# Patient Record
Sex: FEMALE | Race: WHITE
[De-identification: names, ages, dates, MRNs, and addresses within clinical notes are randomized per-mention and may not be internally consistent; named-entity substitution may affect disease eponyms.]

---

## 2019-10-18 ENCOUNTER — HOSPITAL ENCOUNTER (INPATIENT)
Dept: HOSPITAL 15 - ER | Age: 74
LOS: 7 days | Discharge: HOSPICE | DRG: 481 | End: 2019-10-25
Attending: INTERNAL MEDICINE | Admitting: INTERNAL MEDICINE
Payer: COMMERCIAL

## 2019-10-18 VITALS — WEIGHT: 174.17 LBS | BODY MASS INDEX: 27.34 KG/M2 | HEIGHT: 67 IN
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
## 2019-10-18 DIAGNOSIS — E78.5: ICD-10-CM

## 2019-10-18 DIAGNOSIS — Z82.49: ICD-10-CM

## 2019-10-18 DIAGNOSIS — S72.142A: Primary | ICD-10-CM

## 2019-10-18 DIAGNOSIS — N39.0: ICD-10-CM
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
## 2019-10-18 DIAGNOSIS — M19.90: ICD-10-CM
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
## 2019-10-18 DIAGNOSIS — F03.90: ICD-10-CM

## 2019-10-18 DIAGNOSIS — I11.0: ICD-10-CM

## 2019-10-18 DIAGNOSIS — I50.9: ICD-10-CM

## 2019-10-18 PROCEDURE — 97530 THERAPEUTIC ACTIVITIES: CPT

## 2019-10-18 PROCEDURE — 80053 COMPREHEN METABOLIC PANEL: CPT

## 2019-10-18 PROCEDURE — 36415 COLL VENOUS BLD VENIPUNCTURE: CPT

## 2019-10-18 PROCEDURE — 86850 RBC ANTIBODY SCREEN: CPT

## 2019-10-18 PROCEDURE — 93005 ELECTROCARDIOGRAM TRACING: CPT

## 2019-10-18 PROCEDURE — 81001 URINALYSIS AUTO W/SCOPE: CPT

## 2019-10-18 PROCEDURE — 87081 CULTURE SCREEN ONLY: CPT

## 2019-10-18 PROCEDURE — 76000 FLUOROSCOPY <1 HR PHYS/QHP: CPT

## 2019-10-18 PROCEDURE — 74018 RADEX ABDOMEN 1 VIEW: CPT

## 2019-10-18 PROCEDURE — 80048 BASIC METABOLIC PNL TOTAL CA: CPT

## 2019-10-18 PROCEDURE — 86900 BLOOD TYPING SEROLOGIC ABO: CPT

## 2019-10-18 PROCEDURE — 51702 INSERT TEMP BLADDER CATH: CPT

## 2019-10-18 PROCEDURE — 87086 URINE CULTURE/COLONY COUNT: CPT

## 2019-10-18 PROCEDURE — 86901 BLOOD TYPING SEROLOGIC RH(D): CPT

## 2019-10-18 PROCEDURE — 71045 X-RAY EXAM CHEST 1 VIEW: CPT

## 2019-10-18 PROCEDURE — 97110 THERAPEUTIC EXERCISES: CPT

## 2019-10-18 PROCEDURE — 96361 HYDRATE IV INFUSION ADD-ON: CPT

## 2019-10-18 PROCEDURE — 73502 X-RAY EXAM HIP UNI 2-3 VIEWS: CPT

## 2019-10-18 PROCEDURE — 93306 TTE W/DOPPLER COMPLETE: CPT

## 2019-10-18 PROCEDURE — 96376 TX/PRO/DX INJ SAME DRUG ADON: CPT

## 2019-10-18 PROCEDURE — 96374 THER/PROPH/DIAG INJ IV PUSH: CPT

## 2019-10-18 PROCEDURE — 72192 CT PELVIS W/O DYE: CPT

## 2019-10-18 PROCEDURE — 85610 PROTHROMBIN TIME: CPT

## 2019-10-18 PROCEDURE — 96375 TX/PRO/DX INJ NEW DRUG ADDON: CPT

## 2019-10-18 PROCEDURE — 85025 COMPLETE CBC W/AUTO DIFF WBC: CPT

## 2019-10-18 PROCEDURE — 85730 THROMBOPLASTIN TIME PARTIAL: CPT

## 2019-10-19 VITALS — SYSTOLIC BLOOD PRESSURE: 126 MMHG | DIASTOLIC BLOOD PRESSURE: 64 MMHG

## 2019-10-19 VITALS — DIASTOLIC BLOOD PRESSURE: 84 MMHG | SYSTOLIC BLOOD PRESSURE: 121 MMHG

## 2019-10-19 VITALS — DIASTOLIC BLOOD PRESSURE: 74 MMHG | SYSTOLIC BLOOD PRESSURE: 135 MMHG

## 2019-10-19 VITALS — SYSTOLIC BLOOD PRESSURE: 129 MMHG | DIASTOLIC BLOOD PRESSURE: 87 MMHG

## 2019-10-19 VITALS — SYSTOLIC BLOOD PRESSURE: 121 MMHG | DIASTOLIC BLOOD PRESSURE: 71 MMHG

## 2019-10-19 LAB
ALBUMIN SERPL-MCNC: 3.7 G/DL (ref 3.4–5)
ALP SERPL-CCNC: 129 U/L (ref 45–117)
ALT SERPL-CCNC: 23 U/L (ref 13–56)
ANION GAP SERPL CALCULATED.3IONS-SCNC: 6 MMOL/L (ref 5–15)
APTT PPP: 23.8 SEC (ref 23.64–32.05)
BILIRUB SERPL-MCNC: 0.3 MG/DL (ref 0.2–1)
BUN SERPL-MCNC: 23 MG/DL (ref 7–18)
BUN/CREAT SERPL: 25
CALCIUM SERPL-MCNC: 8.4 MG/DL (ref 8.5–10.1)
CHLORIDE SERPL-SCNC: 108 MMOL/L (ref 98–107)
CO2 SERPL-SCNC: 26 MMOL/L (ref 21–32)
GLUCOSE SERPL-MCNC: 118 MG/DL (ref 74–106)
HCT VFR BLD AUTO: 33.8 % (ref 36–46)
HGB BLD-MCNC: 11.3 G/DL (ref 12.2–16.2)
INR PPP: 1.06 (ref 0.9–1.15)
MCH RBC QN AUTO: 27.4 PG (ref 28–32)
MCV RBC AUTO: 82 FL (ref 80–100)
NRBC BLD QL AUTO: 0 %
POTASSIUM SERPL-SCNC: 3.7 MMOL/L (ref 3.5–5.1)
PROT SERPL-MCNC: 7.1 G/DL (ref 6.4–8.2)
SODIUM SERPL-SCNC: 140 MMOL/L (ref 136–145)
WBC CLUMPS UR QL AUTO: PRESENT /HPF

## 2019-10-19 RX ADMIN — HYDROCODONE BITARTRATE AND ACETAMINOPHEN PRN TAB: 10; 325 TABLET ORAL at 11:50

## 2019-10-19 RX ADMIN — CEFTRIAXONE SODIUM SCH MLS/HR: 1 INJECTION, POWDER, FOR SOLUTION INTRAMUSCULAR; INTRAVENOUS at 13:30

## 2019-10-19 RX ADMIN — ONDANSETRON HYDROCHLORIDE PRN MG: 2 INJECTION, SOLUTION INTRAMUSCULAR; INTRAVENOUS at 08:25

## 2019-10-19 RX ADMIN — MORPHINE SULFATE PRN MG: 2 INJECTION, SOLUTION INTRAMUSCULAR; INTRAVENOUS at 15:37

## 2019-10-19 RX ADMIN — MORPHINE SULFATE PRN MG: 2 INJECTION, SOLUTION INTRAMUSCULAR; INTRAVENOUS at 08:24

## 2019-10-19 RX ADMIN — MORPHINE SULFATE PRN MG: 2 INJECTION, SOLUTION INTRAMUSCULAR; INTRAVENOUS at 20:01

## 2019-10-19 RX ADMIN — ONDANSETRON HYDROCHLORIDE PRN MG: 2 INJECTION, SOLUTION INTRAMUSCULAR; INTRAVENOUS at 15:37

## 2019-10-19 RX ADMIN — SODIUM CHLORIDE SCH MLS/HR: 4.5 INJECTION, SOLUTION INTRAVENOUS at 18:35

## 2019-10-19 NOTE — NUR
MED/SURG admit from ER

YESICA JOHNSON admitted to Telemetry unit after SBAR received.  Patient oriented to 
Elaina Kapoor, primary RN, unit, room, bed, and unit policies regarding patient care and 
visiting hours. Patient placed on bedside oxygen, weighed by bedscale and encouraged to call 
if they need something. All questions and concerns addressed, patient verbalized 
understanding. 

Note:

## 2019-10-19 NOTE — NUR
Dr. Stanton



Called to check on patients urine culture, and clarification of medication order. plan is to 
hold off on surgery until Monday will notify patients mariusz Hubbard.

## 2019-10-20 VITALS — SYSTOLIC BLOOD PRESSURE: 116 MMHG | DIASTOLIC BLOOD PRESSURE: 68 MMHG

## 2019-10-20 VITALS — DIASTOLIC BLOOD PRESSURE: 72 MMHG | SYSTOLIC BLOOD PRESSURE: 114 MMHG

## 2019-10-20 VITALS — SYSTOLIC BLOOD PRESSURE: 126 MMHG | DIASTOLIC BLOOD PRESSURE: 82 MMHG

## 2019-10-20 VITALS — DIASTOLIC BLOOD PRESSURE: 67 MMHG | SYSTOLIC BLOOD PRESSURE: 107 MMHG

## 2019-10-20 VITALS — SYSTOLIC BLOOD PRESSURE: 124 MMHG | DIASTOLIC BLOOD PRESSURE: 51 MMHG

## 2019-10-20 LAB
ANION GAP SERPL CALCULATED.3IONS-SCNC: 6 MMOL/L (ref 5–15)
BUN SERPL-MCNC: 17 MG/DL (ref 7–18)
BUN/CREAT SERPL: 24.6
CALCIUM SERPL-MCNC: 8.1 MG/DL (ref 8.5–10.1)
CHLORIDE SERPL-SCNC: 109 MMOL/L (ref 98–107)
CO2 SERPL-SCNC: 27 MMOL/L (ref 21–32)
GLUCOSE SERPL-MCNC: 98 MG/DL (ref 74–106)
HCT VFR BLD AUTO: 31.8 % (ref 36–46)
HGB BLD-MCNC: 10.6 G/DL (ref 12.2–16.2)
MCH RBC QN AUTO: 27.2 PG (ref 28–32)
MCV RBC AUTO: 82.1 FL (ref 80–100)
NRBC BLD QL AUTO: 0.1 %
POTASSIUM SERPL-SCNC: 3.8 MMOL/L (ref 3.5–5.1)
SODIUM SERPL-SCNC: 142 MMOL/L (ref 136–145)

## 2019-10-20 RX ADMIN — MORPHINE SULFATE PRN MG: 2 INJECTION, SOLUTION INTRAMUSCULAR; INTRAVENOUS at 08:28

## 2019-10-20 RX ADMIN — HYDROCODONE BITARTRATE AND ACETAMINOPHEN PRN TAB: 10; 325 TABLET ORAL at 10:34

## 2019-10-20 RX ADMIN — HYDROCODONE BITARTRATE AND ACETAMINOPHEN PRN TAB: 10; 325 TABLET ORAL at 16:08

## 2019-10-20 RX ADMIN — MORPHINE SULFATE PRN MG: 2 INJECTION, SOLUTION INTRAMUSCULAR; INTRAVENOUS at 12:52

## 2019-10-20 RX ADMIN — HYDROCODONE BITARTRATE AND ACETAMINOPHEN PRN TAB: 10; 325 TABLET ORAL at 21:59

## 2019-10-20 RX ADMIN — MORPHINE SULFATE PRN MG: 2 INJECTION, SOLUTION INTRAMUSCULAR; INTRAVENOUS at 17:55

## 2019-10-20 RX ADMIN — SODIUM CHLORIDE SCH MLS/HR: 4.5 INJECTION, SOLUTION INTRAVENOUS at 12:52

## 2019-10-20 RX ADMIN — ENOXAPARIN SODIUM SCH MG: 40 INJECTION SUBCUTANEOUS at 10:00

## 2019-10-20 RX ADMIN — HYDROCODONE BITARTRATE AND ACETAMINOPHEN PRN TAB: 10; 325 TABLET ORAL at 00:06

## 2019-10-20 RX ADMIN — CEFTRIAXONE SODIUM SCH MLS/HR: 1 INJECTION, POWDER, FOR SOLUTION INTRAMUSCULAR; INTRAVENOUS at 08:15

## 2019-10-20 NOTE — NUR
TEMP RECHECK

Rechecked patient temp at this time; 99.8F axillary. Continue cooling measures, will 
continue to monitor

## 2019-10-20 NOTE — NUR
Opening Shift Note

Assumed care of patient, awake and alert. Oriented patient to hospital and primary RN. No 
S/S of distress. Patient c/o pain to left hip. Will medicate patient per MD orders. [place] 
traction to left leg in place, 5 lb weight hanging free. Sweeney in place, below bladder , 
free of kinks, tube and system intact. Instructed on POC and to call for assist PRN. Sitter 
at bedside. Bed in lowest locked position, call light within reach, side rails up x2. 
Patient refusing assessment, educated patient on importance of RN's assessment, patient 
still refusing. Patient stated "Get out of my room". Will continue to monitor for changes 
Q1hr and PRN.

## 2019-10-20 NOTE — NUR
TEMP

Patient temp at this time 100.3F axillary. Cooling measures applied. Will recheck 
approximately 1 hour

## 2019-10-20 NOTE — NUR
FAMILY

Called and spoke with son Stevie. Updated on POC. Stevie verbalized understanding and 
agreement. Stevie stated will come to hospital tomorrow 10/21/19 with complete list of 
patient's home medications for med rec. Will inform day shift RN. Also, Stevie requesting 
to be called before patient is taken off floor for scheduled procedure on 10/21/19. Will 
inform day shift RN

## 2019-10-20 NOTE — NUR
Opening Shift

Assumed care of patient, awake and alert.  No S/S of distress/SOB or pain.  Insructed on POC 
and to callfor assist PRN, will continue to monitor for changes Q1hr and PRN.

 at bedside for patient safety. Fall and safety precautions in place. Call 
light within reach.

## 2019-10-21 VITALS — DIASTOLIC BLOOD PRESSURE: 76 MMHG | SYSTOLIC BLOOD PRESSURE: 130 MMHG

## 2019-10-21 VITALS — SYSTOLIC BLOOD PRESSURE: 127 MMHG | DIASTOLIC BLOOD PRESSURE: 83 MMHG

## 2019-10-21 VITALS — DIASTOLIC BLOOD PRESSURE: 79 MMHG | SYSTOLIC BLOOD PRESSURE: 131 MMHG

## 2019-10-21 VITALS — SYSTOLIC BLOOD PRESSURE: 134 MMHG | DIASTOLIC BLOOD PRESSURE: 77 MMHG

## 2019-10-21 VITALS — SYSTOLIC BLOOD PRESSURE: 139 MMHG | DIASTOLIC BLOOD PRESSURE: 89 MMHG

## 2019-10-21 LAB
ANION GAP SERPL CALCULATED.3IONS-SCNC: 8 MMOL/L (ref 5–15)
APTT PPP: 30.5 SEC (ref 23.64–32.05)
BUN SERPL-MCNC: 18 MG/DL (ref 7–18)
BUN/CREAT SERPL: 23.1
CALCIUM SERPL-MCNC: 8.1 MG/DL (ref 8.5–10.1)
CHLORIDE SERPL-SCNC: 107 MMOL/L (ref 98–107)
CO2 SERPL-SCNC: 26 MMOL/L (ref 21–32)
GLUCOSE SERPL-MCNC: 100 MG/DL (ref 74–106)
HCT VFR BLD AUTO: 35.9 % (ref 36–46)
HGB BLD-MCNC: 11.9 G/DL (ref 12.2–16.2)
INR PPP: 1.03 (ref 0.9–1.15)
MCH RBC QN AUTO: 27.2 PG (ref 28–32)
MCV RBC AUTO: 82.3 FL (ref 80–100)
NRBC BLD QL AUTO: 0.3 %
POTASSIUM SERPL-SCNC: 3.8 MMOL/L (ref 3.5–5.1)
SODIUM SERPL-SCNC: 141 MMOL/L (ref 136–145)

## 2019-10-21 RX ADMIN — HYDROCODONE BITARTRATE AND ACETAMINOPHEN PRN TAB: 10; 325 TABLET ORAL at 03:39

## 2019-10-21 RX ADMIN — MORPHINE SULFATE PRN MG: 2 INJECTION, SOLUTION INTRAMUSCULAR; INTRAVENOUS at 10:54

## 2019-10-21 RX ADMIN — MORPHINE SULFATE PRN MG: 2 INJECTION, SOLUTION INTRAMUSCULAR; INTRAVENOUS at 05:44

## 2019-10-21 RX ADMIN — HYDROCODONE BITARTRATE AND ACETAMINOPHEN PRN TAB: 10; 325 TABLET ORAL at 14:37

## 2019-10-21 RX ADMIN — CEFTRIAXONE SODIUM SCH MLS/HR: 1 INJECTION, POWDER, FOR SOLUTION INTRAMUSCULAR; INTRAVENOUS at 09:00

## 2019-10-21 RX ADMIN — MORPHINE SULFATE PRN MG: 2 INJECTION, SOLUTION INTRAMUSCULAR; INTRAVENOUS at 16:54

## 2019-10-21 RX ADMIN — ENOXAPARIN SODIUM SCH MG: 40 INJECTION SUBCUTANEOUS at 10:00

## 2019-10-21 RX ADMIN — HYDROCODONE BITARTRATE AND ACETAMINOPHEN PRN TAB: 10; 325 TABLET ORAL at 08:21

## 2019-10-21 RX ADMIN — ONDANSETRON HYDROCHLORIDE PRN MG: 2 INJECTION, SOLUTION INTRAMUSCULAR; INTRAVENOUS at 05:44

## 2019-10-21 RX ADMIN — SODIUM CHLORIDE SCH MLS/HR: 4.5 INJECTION, SOLUTION INTRAVENOUS at 10:30

## 2019-10-21 NOTE — NUR
TEMP RECHECK

Rechecked patient's temp at this time; 99.1F axillary. Will continue cooling measures, 
continue to monitor

## 2019-10-21 NOTE — NUR
NUTRITION ASSESSMENT NOTES



Please refer to link notes of nutrition screen form filed under the intervention section of 
the plan of care for further details.



Est. Needs: 1700 kcal to 2100 kcal (20-25 kcal/kgBW), 67 gms to 84 gms pro (0.8-1.0 
gms/kgBW). Will continue to monitor pertinent labs and reassess nutrient need prn



Thank you.


-------------------------------------------------------------------------------

Addendum: 10/21/19 at 1246 by Reanna Gallagher RD

-------------------------------------------------------------------------------

Amended: Links added.

## 2019-10-21 NOTE — NUR
Opening Shift

Assumed care of patient, awake and alert.  No S/S of distress/SOB or pain.  Insructed on POC 
and to callfor assist PRN, will continue to monitor for changes Q1hr and PRN.

 at bedside and mittens placed on bilateral hands for patient safety and 
prevention of tube pulling. Fall and safety precautions in place. Call light within reach.

## 2019-10-21 NOTE — NUR
EKG/CHG

EKG done for pre-op. Patient refusing to cooperate, refusing to lie still for clear EKG. 
Patient refusing education regarding lying still for clear EKG, stating "please take it off, 
it hurts, let me go home, I want to go home." EKG done, printed, and placed in hard chart.



Patient refusing CHG bath at this time, refusing to turn, refusing to cooperate. Patient 
stating same statements as EKG. Partial CHG bath could be completed, complete linen change 
done, and gown changed. Patient did not tolerate well, crying and screaming in pain, even 
after being pre-medicated with PRN pain medication (see emar).

## 2019-10-21 NOTE — NUR
MORRISON CLAMP

Morrison catheter tubing clamped at this time to collect urine sample. Will recheck 
approximately 15 minutes

## 2019-10-21 NOTE — NUR
URINE SAMPLE

Urine sample collected from stock catheter tubing and sent to lab via bullet. Stock catheter 
unclamped. Will continue to monitor

## 2019-10-22 VITALS — SYSTOLIC BLOOD PRESSURE: 119 MMHG | DIASTOLIC BLOOD PRESSURE: 76 MMHG

## 2019-10-22 VITALS — DIASTOLIC BLOOD PRESSURE: 65 MMHG | SYSTOLIC BLOOD PRESSURE: 103 MMHG

## 2019-10-22 VITALS — DIASTOLIC BLOOD PRESSURE: 69 MMHG | SYSTOLIC BLOOD PRESSURE: 139 MMHG

## 2019-10-22 VITALS — DIASTOLIC BLOOD PRESSURE: 69 MMHG | SYSTOLIC BLOOD PRESSURE: 134 MMHG

## 2019-10-22 VITALS — DIASTOLIC BLOOD PRESSURE: 84 MMHG | SYSTOLIC BLOOD PRESSURE: 126 MMHG

## 2019-10-22 VITALS — SYSTOLIC BLOOD PRESSURE: 125 MMHG | DIASTOLIC BLOOD PRESSURE: 78 MMHG

## 2019-10-22 LAB
ANION GAP SERPL CALCULATED.3IONS-SCNC: 8 MMOL/L (ref 5–15)
BUN SERPL-MCNC: 19 MG/DL (ref 7–18)
BUN/CREAT SERPL: 26
CALCIUM SERPL-MCNC: 8.1 MG/DL (ref 8.5–10.1)
CHLORIDE SERPL-SCNC: 108 MMOL/L (ref 98–107)
CO2 SERPL-SCNC: 25 MMOL/L (ref 21–32)
GLUCOSE SERPL-MCNC: 108 MG/DL (ref 74–106)
HCT VFR BLD AUTO: 34 % (ref 36–46)
HGB BLD-MCNC: 11.2 G/DL (ref 12.2–16.2)
MCH RBC QN AUTO: 27 PG (ref 28–32)
MCV RBC AUTO: 82 FL (ref 80–100)
NRBC BLD QL AUTO: 0.1 %
POTASSIUM SERPL-SCNC: 3.9 MMOL/L (ref 3.5–5.1)
SODIUM SERPL-SCNC: 141 MMOL/L (ref 136–145)

## 2019-10-22 PROCEDURE — 0QS706Z REPOSITION LEFT UPPER FEMUR WITH INTRAMEDULLARY INTERNAL FIXATION DEVICE, OPEN APPROACH: ICD-10-PCS | Performed by: ORTHOPAEDIC SURGERY

## 2019-10-22 RX ADMIN — MORPHINE SULFATE PRN MG: 2 INJECTION, SOLUTION INTRAMUSCULAR; INTRAVENOUS at 15:20

## 2019-10-22 RX ADMIN — CEFAZOLIN SODIUM SCH MLS/HR: 1 INJECTION, SOLUTION INTRAVENOUS at 20:41

## 2019-10-22 RX ADMIN — CEFTRIAXONE SODIUM SCH MLS/HR: 1 INJECTION, POWDER, FOR SOLUTION INTRAMUSCULAR; INTRAVENOUS at 09:00

## 2019-10-22 RX ADMIN — SODIUM CHLORIDE SCH MLS/HR: 4.5 INJECTION, SOLUTION INTRAVENOUS at 06:15

## 2019-10-22 RX ADMIN — CEFAZOLIN SODIUM SCH MLS/HR: 1 INJECTION, SOLUTION INTRAVENOUS at 15:21

## 2019-10-22 RX ADMIN — MORPHINE SULFATE PRN MG: 2 INJECTION, SOLUTION INTRAMUSCULAR; INTRAVENOUS at 00:50

## 2019-10-22 RX ADMIN — ENOXAPARIN SODIUM SCH MG: 40 INJECTION SUBCUTANEOUS at 09:08

## 2019-10-22 RX ADMIN — MORPHINE SULFATE PRN MG: 2 INJECTION, SOLUTION INTRAMUSCULAR; INTRAVENOUS at 05:51

## 2019-10-22 RX ADMIN — MORPHINE SULFATE PRN MG: 2 INJECTION, SOLUTION INTRAMUSCULAR; INTRAVENOUS at 20:41

## 2019-10-22 NOTE — NUR
RECEIVED REPORT FROM NIGHT NURSE. PATIENT RESTING IN BED, ALERT TO SELF, CONTINUES TO CALL 
OUT FOR HELP AND REPEATING OVER AND OVER THAT SHE WANTS TO GO HOME. SITTER AT BEDSIDE, 
LUCILA ON TIMES 2, PATIENT CONTINUES TO PULL AT IV LINES AND MORRISON LINE. VITALS STABLE. NPO 
STATUS FOR PROCEDURE.

## 2019-10-22 NOTE — NUR
CHG

Patient refusing and not cooperating for CHG bath. Attempted to educate patient importance 
of CHG bath before procedure and linen change, but patient refusing education, stating "It 
hurts, just please take it out, please take these off, it hurts" and crying. Will inform day 
shift RN that CHG bath not done

## 2019-10-23 VITALS — DIASTOLIC BLOOD PRESSURE: 70 MMHG | SYSTOLIC BLOOD PRESSURE: 114 MMHG

## 2019-10-23 VITALS — SYSTOLIC BLOOD PRESSURE: 134 MMHG | DIASTOLIC BLOOD PRESSURE: 88 MMHG

## 2019-10-23 VITALS — DIASTOLIC BLOOD PRESSURE: 76 MMHG | SYSTOLIC BLOOD PRESSURE: 135 MMHG

## 2019-10-23 VITALS — SYSTOLIC BLOOD PRESSURE: 133 MMHG | DIASTOLIC BLOOD PRESSURE: 78 MMHG

## 2019-10-23 VITALS — DIASTOLIC BLOOD PRESSURE: 81 MMHG | SYSTOLIC BLOOD PRESSURE: 132 MMHG

## 2019-10-23 LAB
ANION GAP SERPL CALCULATED.3IONS-SCNC: 7 MMOL/L (ref 5–15)
BUN SERPL-MCNC: 19 MG/DL (ref 7–18)
BUN/CREAT SERPL: 24.7
CALCIUM SERPL-MCNC: 8 MG/DL (ref 8.5–10.1)
CHLORIDE SERPL-SCNC: 106 MMOL/L (ref 98–107)
CO2 SERPL-SCNC: 27 MMOL/L (ref 21–32)
GLUCOSE SERPL-MCNC: 150 MG/DL (ref 74–106)
HCT VFR BLD AUTO: 32.7 % (ref 36–46)
HGB BLD-MCNC: 10.8 G/DL (ref 12.2–16.2)
MCH RBC QN AUTO: 27 PG (ref 28–32)
MCV RBC AUTO: 82 FL (ref 80–100)
NRBC BLD QL AUTO: 0 %
POTASSIUM SERPL-SCNC: 3.8 MMOL/L (ref 3.5–5.1)
SODIUM SERPL-SCNC: 140 MMOL/L (ref 136–145)

## 2019-10-23 RX ADMIN — CEFAZOLIN SODIUM SCH MLS/HR: 1 INJECTION, SOLUTION INTRAVENOUS at 21:30

## 2019-10-23 RX ADMIN — CEFTRIAXONE SODIUM SCH MLS/HR: 1 INJECTION, POWDER, FOR SOLUTION INTRAMUSCULAR; INTRAVENOUS at 09:29

## 2019-10-23 RX ADMIN — MORPHINE SULFATE PRN MG: 2 INJECTION, SOLUTION INTRAMUSCULAR; INTRAVENOUS at 12:05

## 2019-10-23 RX ADMIN — CEFAZOLIN SODIUM SCH MLS/HR: 1 INJECTION, SOLUTION INTRAVENOUS at 13:36

## 2019-10-23 RX ADMIN — SODIUM CHLORIDE SCH MLS/HR: 4.5 INJECTION, SOLUTION INTRAVENOUS at 06:00

## 2019-10-23 RX ADMIN — HALOPERIDOL PRN MG: 1 TABLET ORAL at 17:12

## 2019-10-23 RX ADMIN — ENOXAPARIN SODIUM SCH MG: 40 INJECTION SUBCUTANEOUS at 09:30

## 2019-10-23 RX ADMIN — SODIUM CHLORIDE SCH MLS/HR: 4.5 INJECTION, SOLUTION INTRAVENOUS at 22:30

## 2019-10-23 RX ADMIN — CEFAZOLIN SODIUM SCH MLS/HR: 1 INJECTION, SOLUTION INTRAVENOUS at 06:01

## 2019-10-23 RX ADMIN — MORPHINE SULFATE PRN MG: 2 INJECTION, SOLUTION INTRAMUSCULAR; INTRAVENOUS at 06:01

## 2019-10-23 NOTE — NUR
SPOKE WITH DOCTOR TROTTER, ORDERS FOR TELE PSYCH FOR DEMENTIA/ MED REQ OBTAINED. WILL PLACE 
AND CARRY OUT.

## 2019-10-23 NOTE — NUR
RECEIVED REPORT FROM NIGHT NURSE. PATIENT RESTING IN BED, ALERT TO SELF. SITTER AT BEDSIDE, 
MITTENS ON TIMES 2, PATIENT CONTINUES TO PULL AT IV LINES AND MORRISON LINE. VITALS STABLE.

## 2019-10-23 NOTE — NUR
URINE SAMPLE

Sweeney tubing clamped, urine sample collected, and sent to lab via bullet. Sweeney tubing 
unclamped, patient tolerated well. Will continue to monitor

## 2019-10-23 NOTE — NUR
assessment

Patient is a 74 year old female who is confused. Per patients mariusz Hubbard prior to admission 
patient resided at [facility] and functioned with staff assistance. Patients 
PCP is Dr Morgan. Per Stevie patient has a rollator for home use. Per Stevie patient was 
in the main room and was walking without her walker and tripped over the rug and fell and 
broke her hip. Per Stevie he is requesting SNF for rehab on discharge. I informed Stevie 
he has a right to speak to a  regarding all care. I informed Stevie he has a 
right to participate in any and all discharge planning.  Patient does not have a POA and 
advanced directive. I have offered Stevie information on POA and advanced directives and 
that he would have to go through the court while patient is confused. Stevie verbalized 
understanding and agreed to discharge plan to SNF.

-------------------------------------------------------------------------------

Addendum: 10/23/19 at 1525 by Becky AGARWAL

-------------------------------------------------------------------------------

Amended: Links added.

## 2019-10-23 NOTE — NUR
ASSUMED CARE, PT. AWAKE, CONFUSED, SITTER AT BEDSIDE, PT. ORIENTED TO HER NAME, DRESSING ON 
LT. HIP DRY AND INTACT, NOT IN DISTRESS.

## 2019-10-24 VITALS — SYSTOLIC BLOOD PRESSURE: 145 MMHG | DIASTOLIC BLOOD PRESSURE: 72 MMHG

## 2019-10-24 VITALS — SYSTOLIC BLOOD PRESSURE: 119 MMHG | DIASTOLIC BLOOD PRESSURE: 77 MMHG

## 2019-10-24 VITALS — SYSTOLIC BLOOD PRESSURE: 105 MMHG | DIASTOLIC BLOOD PRESSURE: 59 MMHG

## 2019-10-24 VITALS — SYSTOLIC BLOOD PRESSURE: 130 MMHG | DIASTOLIC BLOOD PRESSURE: 74 MMHG

## 2019-10-24 VITALS — SYSTOLIC BLOOD PRESSURE: 109 MMHG | DIASTOLIC BLOOD PRESSURE: 68 MMHG

## 2019-10-24 LAB
ANION GAP SERPL CALCULATED.3IONS-SCNC: 7 MMOL/L (ref 5–15)
BUN SERPL-MCNC: 21 MG/DL (ref 7–18)
BUN/CREAT SERPL: 31.8
CALCIUM SERPL-MCNC: 7.9 MG/DL (ref 8.5–10.1)
CHLORIDE SERPL-SCNC: 108 MMOL/L (ref 98–107)
CO2 SERPL-SCNC: 25 MMOL/L (ref 21–32)
GLUCOSE SERPL-MCNC: 132 MG/DL (ref 74–106)
HCT VFR BLD AUTO: 30.9 % (ref 36–46)
HGB BLD-MCNC: 10.4 G/DL (ref 12.2–16.2)
MCH RBC QN AUTO: 27.1 PG (ref 28–32)
MCV RBC AUTO: 80.9 FL (ref 80–100)
NRBC BLD QL AUTO: 0 %
POTASSIUM SERPL-SCNC: 3.4 MMOL/L (ref 3.5–5.1)
SODIUM SERPL-SCNC: 140 MMOL/L (ref 136–145)

## 2019-10-24 RX ADMIN — CEFAZOLIN SODIUM SCH MLS/HR: 1 INJECTION, SOLUTION INTRAVENOUS at 05:30

## 2019-10-24 RX ADMIN — HALOPERIDOL PRN MG: 1 TABLET ORAL at 16:12

## 2019-10-24 RX ADMIN — ENOXAPARIN SODIUM SCH MG: 40 INJECTION SUBCUTANEOUS at 09:56

## 2019-10-24 RX ADMIN — CEFAZOLIN SODIUM SCH MLS/HR: 1 INJECTION, SOLUTION INTRAVENOUS at 16:11

## 2019-10-24 RX ADMIN — SODIUM CHLORIDE SCH MLS/HR: 4.5 INJECTION, SOLUTION INTRAVENOUS at 17:55

## 2019-10-24 RX ADMIN — MORPHINE SULFATE PRN MG: 2 INJECTION, SOLUTION INTRAMUSCULAR; INTRAVENOUS at 11:57

## 2019-10-24 RX ADMIN — CEFTRIAXONE SODIUM SCH MLS/HR: 1 INJECTION, POWDER, FOR SOLUTION INTRAMUSCULAR; INTRAVENOUS at 09:56

## 2019-10-24 NOTE — NUR
SPOKE WITH DOCTOR TROTTER, PATIENT'S SON DEMARCUS HAS DECIDED TO GO [facility] WITH HOSPICE. 
DISCHARGE ON [facility] TO [facility] 10/25

## 2019-10-24 NOTE — NUR
SPOKE TO DOCTOR TROTTER, ORDER  FOR SOCIAL SERVICE CONSULT FOR HOSPICE RECEIVED, WILL PLACE 
AND CARRY OUT.

## 2019-10-24 NOTE — NUR
SPOKE TO PATIENT'S SON, DEMARCUS. DEMARCUS STATES THAT HE DIES NOT WANT THE PATIENT TO GO HOME 
ON HOSPICE, HE WANTS THE PATIENT TO GO TO A NURSING FACILITY. WILL NOTIFY DOCTOR.

## 2019-10-24 NOTE — NUR
RECEIVED REPORT FROM NIGHT NURSE. PATIENT RESTING IN BED. ALERT TO SELF, CONTINUES TO PULL 
AT LINES/ IV. MITTENS ON X2, SITTER AT BEDSIDE.

## 2019-10-24 NOTE — NUR
SPOKE WITH DOCTOR TROTTER, INFORMED HIM THAT PATIENT'S SON DOES NOT WANT HOSPICE. HE STATED 
THAT HE WOULD CONTACT THE .

## 2019-10-24 NOTE — NUR
D/C Planning 



Per  consult for hospice. Information and choice letter was given to Pt mariusz Hubbard via 
phone. Pt son requested [facility]. Pt son stated Pt will be returning to [facility] 
[facility] Ph:( [phone]) [address] [place], [place]. Contacted [facility] Ph:( 
[phone]) Fax:( [phone]) faxed medical records. Per Mayelin from [facility] order 
has been received and service to start upon d/c day. Will set up transportation upon d/c 
day. Informed RN Sarah   

-------------------------------------------------------------------------------

Addendum: 10/25/19 at 0837 by SELIN PARHAM 

-------------------------------------------------------------------------------

Amended: Links added.

## 2019-10-24 NOTE — NUR
Nutrition Follow-up Notes



Wt.: 78.3 kg



Pt was awake with PT at bedside. per records pt with confusion and dementia. pt is currently 
on regular diet with adequate PO of 75% x 4 per RN doc. pt with no distress noted per 
nursing



Est. Needs: 1700 kcal to 2100 kcal (20-25 kcal/kgBW), 67 gms to 84 gms pro (0.8-1.0 
gms/kgBW). Will continue to monitor pertinent labs and reassess nutrient need prn



Labs:  H, CA 7.9 L, BUN 21 H



Skin: [place] scale 14, mod risk, s/p hip sx  per RN documentation.

 

GI: Pt has no BM reported per RN documentation. 



PES: Altered nutrition related lab values r/t current/chronic medical condition aeb 
hypocalcemia, elev. ALP level.



Will continue to monitor PO intake, skin status, pertinent labs and weight trend. F/u in 3-5 
days.

Rec.: 1.) Resume oral diet when medically appropriate.2.) Continue close supervision and 
feeding assistance prn during meals. 3.) Refer to RD for further nutrition educ. and weight 
monitoring upon  discharge. 4.) Continue current plan of care.

## 2019-10-25 VITALS — DIASTOLIC BLOOD PRESSURE: 50 MMHG | SYSTOLIC BLOOD PRESSURE: 94 MMHG

## 2019-10-25 VITALS — SYSTOLIC BLOOD PRESSURE: 122 MMHG | DIASTOLIC BLOOD PRESSURE: 69 MMHG

## 2019-10-25 VITALS — SYSTOLIC BLOOD PRESSURE: 113 MMHG | DIASTOLIC BLOOD PRESSURE: 66 MMHG

## 2019-10-25 LAB
ANION GAP SERPL CALCULATED.3IONS-SCNC: 5 MMOL/L (ref 5–15)
BUN SERPL-MCNC: 24 MG/DL (ref 7–18)
BUN/CREAT SERPL: 36.9
CALCIUM SERPL-MCNC: 7.9 MG/DL (ref 8.5–10.1)
CHLORIDE SERPL-SCNC: 109 MMOL/L (ref 98–107)
CO2 SERPL-SCNC: 28 MMOL/L (ref 21–32)
GLUCOSE SERPL-MCNC: 105 MG/DL (ref 74–106)
HCT VFR BLD AUTO: 29.7 % (ref 36–46)
HGB BLD-MCNC: 9.9 G/DL (ref 12.2–16.2)
MCH RBC QN AUTO: 27.3 PG (ref 28–32)
MCV RBC AUTO: 81.6 FL (ref 80–100)
NRBC BLD QL AUTO: 0 %
POTASSIUM SERPL-SCNC: 3.4 MMOL/L (ref 3.5–5.1)
SODIUM SERPL-SCNC: 142 MMOL/L (ref 136–145)

## 2019-10-25 RX ADMIN — HYDROCODONE BITARTRATE AND ACETAMINOPHEN PRN TAB: 10; 325 TABLET ORAL at 08:40

## 2019-10-25 RX ADMIN — CEFTRIAXONE SODIUM SCH MLS/HR: 1 INJECTION, POWDER, FOR SOLUTION INTRAMUSCULAR; INTRAVENOUS at 09:00

## 2019-10-25 RX ADMIN — ENOXAPARIN SODIUM SCH MG: 40 INJECTION SUBCUTANEOUS at 10:11

## 2019-10-25 NOTE — NUR
SENT TRANSFER PAPER WORK WITH PATIENT HOWEVER PATIENT IS BEING DISCHARGED TO HER RESIDENCE 
AT [facility] . THIS IS NOT AN ACUTE TO ACUTE TRANSFER. PER CASE MANAGEMENT NO TRANSFER PAPERS 
NEED TO BE FILLED OUT, HOSPICE NURSE WILL RE DO MED REC.

## 2019-10-25 NOTE — NUR
Discharge instructions given as ordered. Encourage to follow up with PMD as instructed. All 
questions and concerns addressed. Patient verbalized understanding.  Medication 
reconciliation form completed and copy given to patient. Home medications held in Pharmacy 
returned to patient, and no needed vaccines given. IV removed with catheter intact, pressure 
dressing applied, stock catheter removed. Patient taken to vehicle via wheelchair with all 
personal belongings, accompanied by staff and family member. No distress noted at time of 
departure.

## 2019-10-25 NOTE — NUR
D/C Planning 



Followed up call to [facility] spoke to Mayelin. Advised Mayelin Pt will be d/c today. 
Contacted [facility] Ph:( [phone]) spoke to Davy. Advised Davy to arrange 
transportation between 14:00-15:00 via [employer]. Informed DENVER Dhillon. 

-------------------------------------------------------------------------------

Addendum: 10/25/19 at 0903 by SELIN AGARWAL

-------------------------------------------------------------------------------

Amended: Links added.

## 2019-10-25 NOTE — NUR
Opening shift note

Assumed care of patient from night shift nurse. Patient alert and oriented to self. sitter 
present. Patient informed of plan of care and verbalizes understanding. Bed in lowest 
position, side rails up x2, call light in reach. Will continue to monitor.

## 2020-01-27 ENCOUNTER — HOSPITAL ENCOUNTER (EMERGENCY)
Dept: HOSPITAL 15 - ER | Age: 75
End: 2020-01-27
Payer: COMMERCIAL

## 2020-01-27 VITALS — BODY MASS INDEX: 26.93 KG/M2 | HEIGHT: 63 IN | WEIGHT: 152 LBS

## 2020-01-27 VITALS — SYSTOLIC BLOOD PRESSURE: 113 MMHG | DIASTOLIC BLOOD PRESSURE: 73 MMHG

## 2020-01-27 DIAGNOSIS — R41.82: ICD-10-CM

## 2020-01-27 DIAGNOSIS — J69.0: ICD-10-CM

## 2020-01-27 DIAGNOSIS — I11.0: ICD-10-CM

## 2020-01-27 DIAGNOSIS — I50.9: ICD-10-CM

## 2020-01-27 DIAGNOSIS — A41.9: Primary | ICD-10-CM

## 2020-01-27 DIAGNOSIS — E78.5: ICD-10-CM

## 2020-01-27 DIAGNOSIS — I46.9: ICD-10-CM

## 2020-01-27 DIAGNOSIS — G97.81: ICD-10-CM

## 2020-01-27 LAB
APTT PPP: 24.5 SEC (ref 23.64–32.05)
HCT VFR BLD AUTO: 57.1 % (ref 36–46)
HGB BLD-MCNC: 18.1 G/DL (ref 12.2–16.2)
INR PPP: 1.31 (ref 0.9–1.15)
LACTATE PLASV-SCNC: 9.7 MMOL/L (ref 0.4–2)
MCH RBC QN AUTO: 26.3 PG (ref 28–32)
MCV RBC AUTO: 82.8 FL (ref 80–100)
NRBC BLD QL AUTO: 0.2 %

## 2020-01-27 PROCEDURE — 99152 MOD SED SAME PHYS/QHP 5/>YRS: CPT

## 2020-01-27 PROCEDURE — 99291 CRITICAL CARE FIRST HOUR: CPT

## 2020-01-27 PROCEDURE — 31500 INSERT EMERGENCY AIRWAY: CPT

## 2020-01-27 PROCEDURE — 85379 FIBRIN DEGRADATION QUANT: CPT

## 2020-01-27 PROCEDURE — 85730 THROMBOPLASTIN TIME PARTIAL: CPT

## 2020-01-27 PROCEDURE — 85025 COMPLETE CBC W/AUTO DIFF WBC: CPT

## 2020-01-27 PROCEDURE — 94002 VENT MGMT INPAT INIT DAY: CPT

## 2020-01-27 PROCEDURE — 87040 BLOOD CULTURE FOR BACTERIA: CPT

## 2020-01-27 PROCEDURE — 71045 X-RAY EXAM CHEST 1 VIEW: CPT

## 2020-01-27 PROCEDURE — 92950 HEART/LUNG RESUSCITATION CPR: CPT

## 2020-01-27 PROCEDURE — 83880 ASSAY OF NATRIURETIC PEPTIDE: CPT

## 2020-01-27 PROCEDURE — 83605 ASSAY OF LACTIC ACID: CPT

## 2020-01-27 PROCEDURE — 85610 PROTHROMBIN TIME: CPT

## 2020-01-27 PROCEDURE — 82805 BLOOD GASES W/O2 SATURATION: CPT

## 2020-01-27 PROCEDURE — 99153 MOD SED SAME PHYS/QHP EA: CPT

## 2020-01-27 PROCEDURE — 82962 GLUCOSE BLOOD TEST: CPT

## 2020-01-27 PROCEDURE — 36415 COLL VENOUS BLD VENIPUNCTURE: CPT

## 2020-01-27 PROCEDURE — 36600 WITHDRAWAL OF ARTERIAL BLOOD: CPT
